# Patient Record
Sex: MALE | Race: WHITE | URBAN - METROPOLITAN AREA
[De-identification: names, ages, dates, MRNs, and addresses within clinical notes are randomized per-mention and may not be internally consistent; named-entity substitution may affect disease eponyms.]

---

## 2021-11-20 ENCOUNTER — OFFICE VISIT (OUTPATIENT)
Dept: URBAN - METROPOLITAN AREA CLINIC 45 | Facility: CLINIC | Age: 66
End: 2021-11-20
Payer: MEDICARE

## 2021-11-20 DIAGNOSIS — Z79.84 LONG TERM (CURRENT) USE OF ORAL HYPOGLYCEMIC DRUGS: ICD-10-CM

## 2021-11-20 DIAGNOSIS — H25.13 AGE-RELATED NUCLEAR CATARACT, BILATERAL: ICD-10-CM

## 2021-11-20 DIAGNOSIS — E11.9 TYPE 2 DIABETES MELLITUS W/O COMPLICATION: Primary | ICD-10-CM

## 2021-11-20 PROCEDURE — 99203 OFFICE O/P NEW LOW 30 MIN: CPT | Performed by: OPTOMETRIST

## 2021-11-20 ASSESSMENT — INTRAOCULAR PRESSURE
OS: 18
OD: 17

## 2021-11-20 NOTE — IMPRESSION/PLAN
Impression: Type 2 diabetes mellitus w/o complication: U33.0. Plan: Educated patient on exam findings and importance of good control of blood glucose, regular physical activity, and monitoring by PCP and endocrinology. Instructed patient to call if any vision changes/loss or metamorphopsia.

## 2024-03-04 ENCOUNTER — OFFICE VISIT (OUTPATIENT)
Dept: URBAN - METROPOLITAN AREA CLINIC 45 | Facility: CLINIC | Age: 69
End: 2024-03-04
Payer: MEDICARE

## 2024-03-04 DIAGNOSIS — H43.813 VITREOUS DEGENERATION, BILATERAL: ICD-10-CM

## 2024-03-04 DIAGNOSIS — E11.9 TYPE 2 DIABETES MELLITUS W/O COMPLICATION: Primary | ICD-10-CM

## 2024-03-04 DIAGNOSIS — H35.372 PUCKERING OF MACULA, LEFT EYE: ICD-10-CM

## 2024-03-04 DIAGNOSIS — H25.13 AGE-RELATED NUCLEAR CATARACT, BILATERAL: ICD-10-CM

## 2024-03-04 PROCEDURE — 92134 CPTRZ OPH DX IMG PST SGM RTA: CPT | Performed by: OPTOMETRIST

## 2024-03-04 PROCEDURE — 92014 COMPRE OPH EXAM EST PT 1/>: CPT | Performed by: OPTOMETRIST

## 2024-03-04 ASSESSMENT — INTRAOCULAR PRESSURE
OS: 17
OD: 17

## 2025-03-05 ENCOUNTER — OFFICE VISIT (OUTPATIENT)
Dept: URBAN - METROPOLITAN AREA CLINIC 45 | Facility: CLINIC | Age: 70
End: 2025-03-05
Payer: MEDICARE

## 2025-03-05 DIAGNOSIS — H40.023 OPEN ANGLE WITH BORDERLINE FINDINGS, HIGH RISK, BILATERAL: ICD-10-CM

## 2025-03-05 DIAGNOSIS — E11.9 TYPE 2 DIABETES MELLITUS W/O COMPLICATION: Primary | ICD-10-CM

## 2025-03-05 DIAGNOSIS — H40.033 ANATOMICAL NARROW ANGLE, BILATERAL: ICD-10-CM

## 2025-03-05 DIAGNOSIS — H04.123 DRY EYE SYNDROME OF BILATERAL LACRIMAL GLANDS: ICD-10-CM

## 2025-03-05 DIAGNOSIS — H35.372 PUCKERING OF MACULA, LEFT EYE: ICD-10-CM

## 2025-03-05 DIAGNOSIS — Z79.84 LONG TERM (CURRENT) USE OF ORAL HYPOGLYCEMIC DRUGS: ICD-10-CM

## 2025-03-05 DIAGNOSIS — H43.813 VITREOUS DEGENERATION, BILATERAL: ICD-10-CM

## 2025-03-05 DIAGNOSIS — H25.13 AGE-RELATED NUCLEAR CATARACT, BILATERAL: ICD-10-CM

## 2025-04-07 ENCOUNTER — OFFICE VISIT (OUTPATIENT)
Dept: URBAN - METROPOLITAN AREA CLINIC 45 | Facility: CLINIC | Age: 70
End: 2025-04-07
Payer: MEDICARE

## 2025-04-07 DIAGNOSIS — H40.023 OPEN ANGLE WITH BORDERLINE FINDINGS, HIGH RISK, BILATERAL: Primary | ICD-10-CM

## 2025-04-07 DIAGNOSIS — H02.839 DERMATOCHALASIS OF EYELID: ICD-10-CM

## 2025-04-07 DIAGNOSIS — H04.123 DRY EYE SYNDROME OF BILATERAL LACRIMAL GLANDS: ICD-10-CM

## 2025-04-07 DIAGNOSIS — H25.13 AGE-RELATED NUCLEAR CATARACT, BILATERAL: ICD-10-CM

## 2025-04-07 PROCEDURE — 92083 EXTENDED VISUAL FIELD XM: CPT | Performed by: OPTOMETRIST

## 2025-04-07 PROCEDURE — 76514 ECHO EXAM OF EYE THICKNESS: CPT | Performed by: OPTOMETRIST

## 2025-04-07 PROCEDURE — 92133 CPTRZD OPH DX IMG PST SGM ON: CPT | Performed by: OPTOMETRIST

## 2025-04-07 PROCEDURE — 99212 OFFICE O/P EST SF 10 MIN: CPT | Performed by: OPTOMETRIST

## 2025-04-07 ASSESSMENT — INTRAOCULAR PRESSURE
OS: 16
OD: 16